# Patient Record
Sex: FEMALE
[De-identification: names, ages, dates, MRNs, and addresses within clinical notes are randomized per-mention and may not be internally consistent; named-entity substitution may affect disease eponyms.]

---

## 2019-09-11 ENCOUNTER — HOSPITAL ENCOUNTER (OUTPATIENT)
Dept: HOSPITAL 39 - LAB.O | Age: 69
End: 2019-09-11
Attending: SURGERY
Payer: MEDICARE

## 2019-09-11 DIAGNOSIS — K43.2: Primary | ICD-10-CM

## 2019-09-11 NOTE — CT
EXAM DESCRIPTION:  CT ABDOMEN AND PELVIS WITHOUT AND WITH

CONTRAST



CLINICAL HISTORY: INCISIONAL HERNIA.



COMPARISON: CT abdomen and pelvis 7/16/2018.



TECHNIQUE: CT of the abdomen and pelvis are performed prior to

and during IV bolus administration administration. Delayed phase

imaging was also obtained . No oral contrast was given.



FINDINGS: 

The lung bases are clear.



The Liver, spleen, and pancreas are unremarkable. The gallbladder

is surgically absent.



The right and left kidneys are normal in contour without

hydronephrosis, nephrolithiasis, or perinephric fluid collection.

The ureters are normal. The partially distended bladder is normal

in appearance.



Redemonstration of a hernia along the inferior aspect of the

prior midline incision which contain small amount of

extraperitoneal fat and small portion of the anterior bladder

(which measures approximately 2 x 1.5 cm, unchanged compared to

prior examination. Delayed phase imaging demonstrates contrast

opacification posterior bladder. No significant inflammatory

change to suggest fat or bladder wall strangulation



No focal bowel wall thickening or bowel obstruction. The appendix

is not visualized. Mild atherosclerotic disease affects the aorta

and its branches.  



No free fluid within the pelvis. Prior hysterectomy.



No lymphadenopathy. No acute osseous abnormality.



IMPRESSION: 



1.  Small anterior pelvic hernia along the inferior aspect of the

prior midline incision containing small amount of extraperitoneal

fat and small portion of the anterior bladder, unchanged. No

significant inflammatory change to suggest fat or bladder wall

strangulation.





This exam was performed according to our departmental

dose-optimization program, which includes automated exposure

control, adjustment of the mA and/or kV according to patient size

and/or use of iterative reconstruction technique.



Electronically signed by:  Joseph Johnson DO  9/11/2019 5:49 PM CDT

Workstation: [id number]

## 2019-09-23 ENCOUNTER — HOSPITAL ENCOUNTER (OUTPATIENT)
Dept: HOSPITAL 39 - AMB | Age: 69
Discharge: HOME | End: 2019-09-23
Attending: SURGERY
Payer: MEDICARE

## 2019-09-23 VITALS — DIASTOLIC BLOOD PRESSURE: 73 MMHG | OXYGEN SATURATION: 94 % | SYSTOLIC BLOOD PRESSURE: 140 MMHG | TEMPERATURE: 97.2 F

## 2019-09-23 DIAGNOSIS — G47.00: ICD-10-CM

## 2019-09-23 DIAGNOSIS — E55.9: ICD-10-CM

## 2019-09-23 DIAGNOSIS — F41.9: ICD-10-CM

## 2019-09-23 DIAGNOSIS — Z79.899: ICD-10-CM

## 2019-09-23 DIAGNOSIS — M79.7: ICD-10-CM

## 2019-09-23 DIAGNOSIS — K21.9: ICD-10-CM

## 2019-09-23 DIAGNOSIS — K43.2: Primary | ICD-10-CM

## 2019-09-23 DIAGNOSIS — Z87.442: ICD-10-CM

## 2019-09-23 DIAGNOSIS — M81.0: ICD-10-CM

## 2019-09-23 DIAGNOSIS — Z79.82: ICD-10-CM

## 2019-09-23 DIAGNOSIS — R00.1: ICD-10-CM

## 2019-09-23 PROCEDURE — 85025 COMPLETE CBC W/AUTO DIFF WBC: CPT

## 2019-09-23 PROCEDURE — 49568: CPT

## 2019-09-23 PROCEDURE — 71046 X-RAY EXAM CHEST 2 VIEWS: CPT

## 2019-09-23 PROCEDURE — 93005 ELECTROCARDIOGRAM TRACING: CPT

## 2019-09-23 PROCEDURE — 80048 BASIC METABOLIC PNL TOTAL CA: CPT

## 2019-09-23 PROCEDURE — 49560: CPT

## 2019-09-23 PROCEDURE — 00832 ANES HERNIA REPAIR VNT&INCAL: CPT

## 2019-09-23 PROCEDURE — 81001 URINALYSIS AUTO W/SCOPE: CPT

## 2019-09-23 PROCEDURE — 36415 COLL VENOUS BLD VENIPUNCTURE: CPT

## 2019-09-23 RX ADMIN — HYDROMORPHONE HYDROCHLORIDE ONE MG: 2 INJECTION, SOLUTION INTRAMUSCULAR; INTRAVENOUS; SUBCUTANEOUS at 10:50

## 2019-09-23 RX ADMIN — HYDROMORPHONE HYDROCHLORIDE ONE MG: 2 INJECTION, SOLUTION INTRAMUSCULAR; INTRAVENOUS; SUBCUTANEOUS at 11:00

## 2019-09-23 RX ADMIN — HYDROMORPHONE HYDROCHLORIDE ONE MG: 2 INJECTION, SOLUTION INTRAMUSCULAR; INTRAVENOUS; SUBCUTANEOUS at 10:40

## 2019-09-23 RX ADMIN — HYDROMORPHONE HYDROCHLORIDE ONE MG: 2 INJECTION, SOLUTION INTRAMUSCULAR; INTRAVENOUS; SUBCUTANEOUS at 10:30

## 2019-09-23 RX ADMIN — HYDROMORPHONE HYDROCHLORIDE ONE MG: 2 INJECTION, SOLUTION INTRAMUSCULAR; INTRAVENOUS; SUBCUTANEOUS at 11:10

## 2019-09-23 NOTE — OP
DATE OF PROCEDURE:  09/23/19



PREOPERATIVE DIAGNOSIS: 

1.  Incisional hernia.



POSTOPERATIVE DIAGNOSIS: 

1.  Incisional hernia.



PROCEDURE: 

1.  Repair of incisional hernia with Surgimesh Onlay graft.



SURGEON:  Donald A. Behr, MD.



ASSISTANT:  Yoseph Chakraborty MD.



ANESTHESIA:  General endotracheal anesthesia and local infiltration of 0.25% 
Marcaine with epinephrine.



INDICATION:  The patient is a 69-year-old female who has had left lower quadrant
pain for some time.  She also has significant back pain and has been receiving 
injections.  CT scan reveals a midline incisional hernia in the lower abdomen 
which involves the bladder.  There is no obvious hernia in the left lower 
quadrant.  The patient was brought to the Surgical Suite today for repair of the
incisional hernia and exploration of the left lower quadrant abdominal wall.  



FINDINGS: The defect was approximately 4 by 3 cm.  It contained the bladder, 
which was reduced.  Exploration of the left lower quadrant including having the 
patient Valsalva'd by Anesthesia and revealed no hernia defect in the left lower
quadrant.  



PROCEDURE:  After adequate general endotracheal anesthesia was obtained, the 
patient was prepped and draped in the usual sterile manner.  A surgical time-out
was taken.  A previous Pfannenstiel incision was injected with local anesthesia 
and extended to the left side.  The skin was incised with a knife and dissection
was carried down through the skin and subcutaneous tissue using electrocautery 
and blunt dissection.  When this was done, the fascia was identified and the 
hernia was identified.  It was dissected free circumferentially and reduced 
below the floor of the canal.  Scar tissue was incised back to good fascia.  
Hemostasis was noted to be adequate.  At this point, the hernia defect was 
closed with interrupted #1 PDS foldable sutures.  It was closed vertically.  
These placed initially and then tightened and tied sequentially.  When this was 
done, the wound was irrigated with saline.  At this point, the left lower 
quadrant was explored.  The external inguinal canal was identified with no 
defect including, as noted, with a Valsalva.  No other defects were identified. 
At this point, a 9 by 5 cm oval Surgimesh graft was placed over the repair and 
sutured circumferentially with interrupted 2-0 Prolene and 2-0 Vicryl sutures.  
When this was done, again, hemostasis was noted to be adequate.  The wound was 
irrigated with saline.  The subcutaneous tissue was then closed with interrupted
simple sutures in two layers.  The skin edges were approximated with skin 
stapler.  Sterile pressure dressing was applied.  The patient was awakened and 
taken to the Recovery Room.  Her Matthews catheter was removed in the Operating 
Room.  Estimated blood loss was 100 mL.  All sponge, needle and instrument 
counts were correct.



#[id number]

[facility]

## 2019-09-23 NOTE — RAD
EXAM DESCRIPTION:



XR CHEST 2 VIEWS



CLINICAL HISTORY:



Preoperative respiratory evaluation. Z01.811. Hernia surgery

planning.



COMPARISON:



None



TECHNIQUE:



PA/lateral



FINDINGS:



Heart size is normal. The lungs are clear. No acute bony

abnormality.



IMPRESSION:



No acute cardiopulmonary process.







Electronically signed by:  Maxx Varner MD  9/23/2019 7:43 AM

CDT Workstation: [id number]

## 2020-05-06 ENCOUNTER — HOSPITAL ENCOUNTER (OUTPATIENT)
Dept: HOSPITAL 39 - LAB.O | Age: 70
End: 2020-05-06
Attending: SURGERY
Payer: MEDICARE

## 2020-05-06 DIAGNOSIS — R30.0: Primary | ICD-10-CM
